# Patient Record
Sex: MALE | Employment: OTHER | URBAN - METROPOLITAN AREA
[De-identification: names, ages, dates, MRNs, and addresses within clinical notes are randomized per-mention and may not be internally consistent; named-entity substitution may affect disease eponyms.]

---

## 2019-01-10 ENCOUNTER — TRANSFERRED RECORDS (OUTPATIENT)
Dept: HEALTH INFORMATION MANAGEMENT | Facility: CLINIC | Age: 74
End: 2019-01-10

## 2020-02-17 ENCOUNTER — DOCUMENTATION ONLY (OUTPATIENT)
Dept: CARE COORDINATION | Facility: CLINIC | Age: 75
End: 2020-02-17

## 2020-02-19 ENCOUNTER — OFFICE VISIT (OUTPATIENT)
Dept: OPHTHALMOLOGY | Facility: CLINIC | Age: 75
End: 2020-02-19
Payer: COMMERCIAL

## 2020-02-19 DIAGNOSIS — H02.88B MEIBOMIAN GLAND DYSFUNCTION (MGD) OF UPPER AND LOWER LIDS OF BOTH EYES: ICD-10-CM

## 2020-02-19 DIAGNOSIS — H35.373 EPIRETINAL MEMBRANE (ERM) OF BOTH EYES: ICD-10-CM

## 2020-02-19 DIAGNOSIS — H02.88A MEIBOMIAN GLAND DYSFUNCTION (MGD) OF UPPER AND LOWER LIDS OF BOTH EYES: ICD-10-CM

## 2020-02-19 DIAGNOSIS — H52.223 REGULAR ASTIGMATISM OF BOTH EYES: Primary | ICD-10-CM

## 2020-02-19 DIAGNOSIS — Z96.1 PSEUDOPHAKIA OF BOTH EYES: ICD-10-CM

## 2020-02-19 DIAGNOSIS — H35.3132 INTERMEDIATE STAGE NONEXUDATIVE AGE-RELATED MACULAR DEGENERATION OF BOTH EYES: ICD-10-CM

## 2020-02-19 PROBLEM — M19.90 ARTHRITIS: Status: ACTIVE | Noted: 2020-02-19

## 2020-02-19 PROBLEM — I10 HTN (HYPERTENSION): Status: ACTIVE | Noted: 2020-02-19

## 2020-02-19 PROBLEM — F31.11: Status: ACTIVE | Noted: 2020-02-19

## 2020-02-19 PROBLEM — M79.7 FIBROMYALGIA: Status: ACTIVE | Noted: 2020-02-19

## 2020-02-19 RX ORDER — LISINOPRIL AND HYDROCHLOROTHIAZIDE 20; 25 MG/1; MG/1
1 TABLET ORAL DAILY
COMMUNITY
Start: 2019-09-23

## 2020-02-19 ASSESSMENT — REFRACTION_WEARINGRX
OS_SPHERE: -1.00
SPECS_TYPE: PAL
OD_SPHERE: -0.50
OD_CYLINDER: +1.50
OS_AXIS: 065
OS_CYLINDER: +1.75
OS_ADD: +2.50
OD_AXIS: 120
OD_ADD: +2.50

## 2020-02-19 ASSESSMENT — REFRACTION_MANIFEST
OS_ADD: +2.50
OD_SPHERE: -0.75
OS_CYLINDER: +1.75
OD_AXIS: 140
OS_SPHERE: -1.25
OS_AXIS: 055
OD_ADD: +2.50
OD_CYLINDER: +1.50

## 2020-02-19 ASSESSMENT — CONF VISUAL FIELD
OD_NORMAL: 1
OS_NORMAL: 1
METHOD: COUNTING FINGERS

## 2020-02-19 ASSESSMENT — TONOMETRY
OD_IOP_MMHG: 10
IOP_METHOD: ICARE
OS_IOP_MMHG: 11

## 2020-02-19 ASSESSMENT — VISUAL ACUITY
OD_CC+: -2
OD_CC: 20/30
OS_CC: 20/40
METHOD: SNELLEN - LINEAR
OS_CC+: -2

## 2020-02-19 ASSESSMENT — CUP TO DISC RATIO
OD_RATIO: 0.2
OS_RATIO: 0.25

## 2020-02-19 NOTE — NURSING NOTE
Chief Complaints and History of Present Illnesses   Patient presents with     COMPREHENSIVE EYE EXAM     Chief Complaint(s) and History of Present Illness(es)     COMPREHENSIVE EYE EXAM     Laterality: both eyes    Onset: years ago    Frequency: constantly    Course: stable    Associated symptoms: itching    Treatments tried: no treatments    Pain scale: 0/10              Comments     Annual exam. Pt states vision may be have gotten worse. Pt concerned of Macular degeneration- mom had it. Pt has some dry itchy eyes.   Would like to know how much eye vitamins to take and would like recommendation on eye drops.    H/o of 4 sinus surgery- over 10 years ago  S/p lid surgery       DAYNE Frank COT 11:37 AM February 19, 2020

## 2020-02-19 NOTE — PROGRESS NOTES
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  This started years ago.  Occurring constantly.  Since onset it is stable.  Associated symptoms include itching.  Treatments tried include no treatments.  Pain was noted as 0/10.              Comments     Annual exam. Pt states vision may be have gotten worse. Pt concerned of Macular degeneration- mom had it. Pt has some dry itchy eyes.   Would like to know how much eye vitamins to take and would like recommendation on eye drops.    H/o of 4 sinus surgery- over 10 years ago  S/p lid surgery       DAYNE Frank COT 11:37 AM February 19, 2020             Last edited by Murphy Call COT on 2/19/2020 11:38 AM. (History)          Assessment/Plan  (H52.223) Regular astigmatism of both eyes  (primary encounter diagnosis)  Comment: Mixed astigmatism with presbyopia both eyes   Plan: REFRACTION   Educated patient on condition and clinical findings. Dispensed spectacle prescription for full time wear. Educated patient on possibility of adaptation period, if symptoms do not improve return to clinic for further testing.    (H35.373) Epiretinal membrane (ERM) of both eyes  Comment: Not affecting fovea  Plan:  No treatment indicated at this time. Monitor annually.     (H35.3132) Intermediate stage nonexudative age-related macular degeneration of both eyes  Comment: Intermediate drusen with large drusenoid PEDs underlying fovea both eyes   Plan:  Referred to Dr. Lau for retina consultation and further monitoring. Recommended beginning AREDS 2 supplement twice each day.    (Z96.1) Pseudophakia of both eyes  Comment: Stable  Plan:  No treatment indicated at this time. Monitor annually.    (H02.88A,  H02.88B) Meibomian gland dysfunction (MGD) of upper and lower lids of both eyes  Comment: Causing irritation, denies epiphora  Plan:  Recommended warm  compresses and artificial tears for comfort. Monitor as needed.      Complete documentation of historical and exam elements from today's encounter can  be found in the full encounter summary report (not reduplicated in this progress  note). I personally obtained the chief complaint(s) and history of present illness. I  confirmed and edited as necessary the review of systems, past medical/surgical  history, family history, social history, and examination findings as documented by  others; and I examined the patient myself. I personally reviewed the relevant tests,  images, and reports as documented above. I formulated and edited as necessary the  assessment and plan and discussed the findings and management plan with the  patient and family.    Colt Andre OD, FAAO

## 2020-02-25 ENCOUNTER — TELEPHONE (OUTPATIENT)
Dept: OPHTHALMOLOGY | Facility: CLINIC | Age: 75
End: 2020-02-25

## 2020-02-25 NOTE — TELEPHONE ENCOUNTER
Nicole Flako's voicemail to explain his appt Wednesday is not for a new Retina. His appt is listed as New Retina because this is his first appt with Dr. Lau and that any of his follow up appts will be listed as Return Retina. He understood once explained and will come to his appt tomorrow.

## 2020-02-26 ENCOUNTER — OFFICE VISIT (OUTPATIENT)
Dept: OPHTHALMOLOGY | Facility: CLINIC | Age: 75
End: 2020-02-26
Attending: OPHTHALMOLOGY
Payer: COMMERCIAL

## 2020-02-26 DIAGNOSIS — H35.3132 INTERMEDIATE STAGE NONEXUDATIVE AGE-RELATED MACULAR DEGENERATION OF BOTH EYES: Primary | ICD-10-CM

## 2020-02-26 PROCEDURE — G0463 HOSPITAL OUTPT CLINIC VISIT: HCPCS | Mod: ZF

## 2020-02-26 PROCEDURE — 92250 FUNDUS PHOTOGRAPHY W/I&R: CPT | Mod: ZF | Performed by: OPHTHALMOLOGY

## 2020-02-26 ASSESSMENT — CUP TO DISC RATIO
OS_RATIO: 0.25
OD_RATIO: 0.2

## 2020-02-26 ASSESSMENT — REFRACTION_WEARINGRX
OS_CYLINDER: +1.75
OD_CYLINDER: +1.50
OD_AXIS: 120
OS_ADD: +2.50
SPECS_TYPE: PAL
OS_AXIS: 065
OD_SPHERE: -0.50
OS_SPHERE: -1.00
OD_ADD: +2.50

## 2020-02-26 ASSESSMENT — TONOMETRY
OD_IOP_MMHG: 12
OS_IOP_MMHG: 15
IOP_METHOD: TONOPEN

## 2020-02-26 ASSESSMENT — CONF VISUAL FIELD
OD_NORMAL: 1
OS_NORMAL: 1
METHOD: COUNTING FINGERS

## 2020-02-26 ASSESSMENT — VISUAL ACUITY
OD_CC: 20/25
OD_CC+: -2
METHOD: SNELLEN - LINEAR
OS_CC+: -3
OS_CC: 20/50
CORRECTION_TYPE: GLASSES

## 2020-02-26 NOTE — NURSING NOTE
Chief Complaints and History of Present Illnesses   Patient presents with     Epiretinal Membrane Evaluation     Both eyes     Chief Complaint(s) and History of Present Illness(es)     Epiretinal Membrane Evaluation     Comments: Both eyes              Comments     Pt states vision is the same as last week. Pt ordered new glasses but they have not arrived yet.   No eye pain today. Occasional itching in both eyes, relief with drops.  No flashes or floaters.    ALBERTO Bartholomew February 26, 2020 9:35 AM

## 2020-02-26 NOTE — PROGRESS NOTES
I have confirmed the patient's and reviewed Past Medical History, Past Surgical History, Social History, Family History, Problem List, Medication List and agree with Tech note.    CC: blurry vision both eyes     HPI: consult Dr. Andre for maculopathy both eyes     Assessment/plan:   1.  Epiretinal membrane both eyes    - Optical Coherence Tomography Scan on file    - monitor and repeat Optical Coherence Tomography Scan in 3 months     2.  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  Professor & Chair

## 2020-07-07 ENCOUNTER — TELEPHONE (OUTPATIENT)
Dept: OPHTHALMOLOGY | Facility: CLINIC | Age: 75
End: 2020-07-07